# Patient Record
(demographics unavailable — no encounter records)

---

## 2024-10-10 NOTE — REASON FOR VISIT
[Home] : at home, [unfilled] , at the time of the visit. [Medical Office: ([facility])___] : at the medical office located in  [Father] : father [FreeTextEntry2] : Father

## 2024-10-10 NOTE — HISTORY OF PRESENT ILLNESS
[FreeTextEntry1] : I had the pleasure of following up your patient at [facility]       HAVEN DUMONT is a  13 year old RH followed for epilepsy and complicated migraine.  He has been diagnosed with ADHD and dyslexia.  Family is focused on the retention of cognit It is difficult to discern which is which in [place] given his language limitations, but he clearly has had both.   He has a history of abnormal thyroid function in the past, as well as speech and learning delays. He has a co-morbid diagnosis of ASD.   On 12/20, he was at tutoring. He had an episode of zoning out. He was holding the wall like he was dizzy.  He was out of it. It lasted for 45 seconds to 60 seconds.  He did have a HA afterwards. He felt like he was in a movie, by his description.    In 12/22:  he recalled that he had a similar event. He was in the car and he didn't answer. He looked like he was going to emesis. He didn't answer. He couldn't get out of the car. He gets HA frequently.   There has been other spacing events or zoning out events.  He spaces out but without any other issues associated with them.   Headaches occur 1-2/ every 2 weeks. Both parents have HA. On Adderall, he would get migraine HA with emesis.  The Focalin is being held to make sure it is not contributing to events.   He does get carsick.  He does have ADHD. Was seeing Gary Gonzalez. His developmental specialist, Keagan Blackwell is current.  He has language processing issues, and receives Special Education. He gets SETS and Speech therapy.  He is on Focalin and may have had increased HA. He didn't roll his eyes. He did not have incontinence. He looked like he was going to have emesis.  He started having HA several  7years ago, and the Adderall increased the HA. And, better on Focalin although increased   He would take tylenol. He did not miss much school, typically later in the day after school the HA occur.  He was taking vitamins -- they would make him have emesis.   He is in  special education and Speech therapy.   Sleep: does well.  Exercise: karate 2/week.  Diet:Good appetite.  Hydrate: encouraged to Drink more.      On 4/5/2023, he presented with migraines. . He was supplemented on Fish oil and Migralief.  The tutors of school  have seemed to help  him. He feels that his vision is going fast. He may feel a HA on the side of his head.  He is scared afterwards, and he is extremely fatigued afterwards.  He responded well afterwards.    He then had an event that was either convulsion vs complicated migraine on 4/6/2023.  He had another VEEG which demonstrated rare, generalized discharges, maximal right centro-temporal.  Because of the combination of HA and possible event, we elected to start TPM.  He was started in house and it was increased slowly to current dose of 50 bid.  He may have some mild word finding difficulties - but had that at baseline.  No other side effects noted.  No report of HA or convulsion noted.    6-23:  Was started on TPM in 4/16/23 due to migraine and epilepsy.  He was seen on 5/23, and had a small event. He was not clear about all the details.  He says he feels it and there are little ones. He is not tired or has a HA afterwards.  Then, this past week, he had another event while exercise.  No headache afterwards.  No materiel side effects of TPM , except for word finding issues, which have been long-term.  Events have occurred between 12- 3 pm on both occasions.  HA are controlled -- he may have had 1-2 overall, very brief, very mild.   8-23: Tolerating TPM w/o difficulty. He is completely off of the TPM since 11/15/23.  Since then, he has no episode.  Focalin on hold.  Speech and language is already a concern, so we are adjusting slowly.  Initially TPM caused some speech hesitation, now no effect.  No events since a minor one on 7/4/2023 in the am. Had not yet gone outside.  10/23: Father has documented events and categorized whether he was on Focalin vs TPM. He still has brief events of spacing out when on the TPM, about 1/month. They are not noticeable, and he will tell  the family that he had one. They are not HA, since HA are mild and he clearly states that he has a HA.  However, on the TPM and not on Focalin, his reading skills have regressed . The family states that was a problem in the past when he was not on Focalin, but now is continuing.   The family's feeling is that he should not continue TPM, since they don't feel that it has helped that much. But, they would like to restart medication for ADHD.    12-23: He is doing well. He has been weaned off TPM completely since 11/12/2023.  He had a heavy metal limit -- has increased levels of lead and mercury.  He had a chelation test.  No significant HA.  Father had an improvement in his response time, especially in terms of reading.  Focal therapy was not beneficial.   1-24:  In December, she was flapping, convulsing and was chewing.  He was not responsive. He was able to talk. His mouth was to the right side ( ?)  and speech was slurred.  Very tired, fell asleep and took a long time to stay.  Since last 11/22: he has had total of 16 events, and 4 were significant. And, the rest were brief, not sure. Parents never saw of these.  Focalin -- on for the first events.  The latest were different in that  He was on a detox on EDTA  and a lot of supplements.  Headaches are mild and sporadic. Tylenol : 1-2 in last 6 months. They do feel that his spaciness and cognitive slowing did improve after dc'ing TPM.   Behavioral: Sweet person, engaging, has learning issues.  Sleep: good, regular  Appetite: good. Omitting dairy with mild reaction.   2-24: In January 19, he had a more significant focal seizure. On January 24th, he had a "lower impact" event, as parents call it, when he zones out briefly, and he tells parents about it.  They started CBD on 2/3. He takes 1 tablet, pure isolate. 33 mg in total.  [facility], tincture.  He is not doing the EDTA chelation therapy.  He is taking Magnesium supplements. He is taking 2 capsules at night.  Nothing else since then.  Sleep: he has always been a good sleeper.  Not sedated on the CBD. No change in appetite.  Haven's brother was on CBD since 2019 -- behavioral management.   6-24:  He is on the CBD: 66 mg in the AM, and 33 mid-day, and 33 at night.  He still has monthly events. The velocity of them has decreased.  No sedation. His eating is fine.  No side effects.  He had a brief spell when he had a subjective change.  Parents did that already.  He was climbing a rope.  He had a seizure with chelation therapy. Water filtration.   6-24:  Since the last appt, Haven had an episode a few days afterwards.  More extreme than recently.  It occurred at 8 pm : 5 hours after his mid-day dose.  We had potentially wanted to increase at night.  He now takes  66 mg - 66 mg - 33 mg at night.  He gets mildly sedated from that dose.  His event happened after a hip hop class and it was very hot outside.  Little articulation about what happened exactly.  Prolonged discussion regarding his academics. Has not progressed academically over the year.  I recommended neuropsy eval, and he is due for it this year. They have him tested privately.     7-24:  Family has a place at the [facility] for the summer.  No significant updates with no significant events.  Franky has been struggling with a chronic stuffy nose. He has a deviated septum.  He does have a  Ligia Byrd for ENT. Karen helped them out with sooner appt.  No breakthrough events.  No side effects of CBD: very effective.  His weight : 105 lbs.    7-24 ( 2) : He had an event for 1-2 minutes. His head tilted to the side. He gets very hot. He was likely consciousness. He did not felt down. He retained tone. He came out of it well and did not have a HA.  It happened during the karate. In general, he has had 6 events since February: at an amusement park, at karate multiple times, at a hip hop class.  There are breaks during the class. He is not overworked or overheated.  He is on the dose below, with no change.  Frequency is about 1/month. The severity was less on the TPM, but he had cognitive effect.  The family is looking at [employer] and [employer]  Phosphatidylcholine PPC    9- 24:  He was diagnosed as DEREJE. He has deviated septum and had frequently been stuffy.  He was chronic sinus issues, trying Flonase etc.  He has a lot of allergies to begin with.  He needs clearance. [facility]  Outpatient procedure scheduled, but given his epilepsy may need to be observed overnight.  Majority in the  second, 1/2 half of the day. Seizures can occur with breathing issues, and when he gets overheated.  VEEG can be added afterwards procedure to insure that epilepsy not present.   10-24: In the interim, the patient had a more severe GTCS. They discussed treatment options with Dr Crystal and initiated LCT.  Pt is on a low dose of medication, 25 mg, with no side effects.  We reviewed side effects in detail again and father had many questions.  We also discussed increase schedule.  Pt will have ENT procedure on 11/25 and family is interested in   In addition, they did blood work : Mono+, Lyme+.: IgG and IgM  Bactrim followed by Sukumarline.   He is in 8th grade, and he is homeschooled. He goes to a home school co-op. Tutoring services in addition are being arranged.  MEDICATIONS:     Focalin 10 mg daily- on hold  CBD 66mg  tid   -Rescue Medications:  Advil prn HA   -Other medications:    Past Medications:   Adderal -- increased HA . and parents worried it induced seizures.  He was started on TPM but family felt it worsened language and caused cognitive slowing.  All: Walnuts, cats   BHx: 1 month premature. No complications in the pregnancy.  Did not need to go to nursery.   Development: Older brother, has more severe ASD .  Speech emerged late. He did have pointing. His development was delayed, but his speech is delayed. Socially, but immature.   Surg: none  FHX sig for:  Older brother with ASD HA : in father, started as a young man, probably teens. And temperature related. Every week or every other week. takes advil if needed.  Mother has sinus related HA. Needs to sleep. OTC migraine medication.  Father has significant HA with emesis, but only yearly. He walks them off. He can have word finding issues.  His older brother has more significant medical problems. He is on VPA for mood stabilization, and Aptiom.  The family saw Dr. Welsh for Sonu.  CBD has helped his mood.   REVIEW OF SYSTEMS:  A 14-point review of systems was otherwise  Significant for:  carsick, intermittent emesis if he eats too much especially junk food.          PHYSICAL EXAMINATION:   Vital signs: see chart    PHYSICAL EXAMINATION:     GENERAL:     Awake, responsive,    HEAD:  Normocephalic, atraumatic.   EYES:  Conjunctiva clear, sclera non-icteric.   NECK:  supple.   RESPIRATORY: No respiratory distress   MUSCULOSKELETAl:  full range of motion in joints.   EXTREMITIES:  no abnormalities noted.   SKIN:   no rash, no neurocutaneous lesions.      NEUROLOGIC EXAMINATION:   Mental Status/Language:  Expressive language deficit, but interactive socially.   Cranial Nerves:  PERRL, EOMI ,  no facial weakness,  hearing intact, tongue protrusion in the midline, symmetric head turn and shoulder shrug.   Strength:  No focal weakness   Coordination:  Finger to nose testing normal, no adventitial movements.   Stance/Gait:  Normal gait       TESTING:    Blood tests:  Thyroid normal in repeat testing.   EEG:    AVEEG/VEEG:  - Normal 1/2023,  4/23: VEEG in house on 4/23 demonstrated rare gen dc's that were maximal in the right centro-temp region.  - Rare gen dc's?  12/23:normal. Rechecked by myself. Multiple pushbutton markers negative.  1/24: Normal AEEG   MRI:  Normal 1/2023           Normal 3/24   Other:  Head CT: normal   IMPRESSION:     HAVEN DUMONT is a 13 year old RH with and expressive language delay ( HE DOES NOT ADHD).   He has been diagnosed with ADHD, dyslexia and language based LD.  He has events where he feels an aura, wants to lay down. Recovers after a quick nap and complains of a HA afterwards.  There is a strong family hx of migraine.  AEEG and MRI were normal in the past, but on 4/23, was admitted after a similar event, and VEEG showed rare gen dc's. maximal in right centro-temp region.   HA are well controlled.   After subsequent events, and a discussion about ASMs, family chose CBD. Haven's brother, Sonu, has nonverbal ASD, and has done very welll on CBD.  Since starting the CBD , he has had only minor events. He has no side effects from the dose.  But, subsequently, he has started on LCT, low dose.  Increase schedule provided.   PLAN:  - Coordinate VEEG testing with upcoming admission to the hospital for ENT procedure. Leads can be added in recovery.  - Seizure precautions reviewed. Clonazepam given for breakthrough epileptic event. Administration described in detail.  - Family and I have had multiple discussions regarding ASMs, and they are comfortable with his current treatment on CBD. He has no SE on CBD.   - If ASM chosen, would consider a once daily dosing, ie , LCT, LCS, or Xcopri.  LCT preferable for cognitive stability  - - For ADHD, can also consider a non-stimulant, such as Clonidine ,  Amoxetine,or  viloxetine. Family prefers to wait on this decision.  - Call with any further events, concerns, questions.  - He is on a dairy free diet with Mg supplementation   - F/u after testing.     Thank you for allowing us to participate in the care of your patient.  If you have any further questions, please call our office.

## 2024-12-04 NOTE — PHYSICAL EXAM
[de-identified] : Deleon splints removed from bilateral nares without issues.   Septum healed well, no perforation. left sided deviation significantly improved and significantly improved left side nasal airflow.  [Midline] : trachea located in midline position [de-identified] : size 2 [Normal] : assessment of respiratory effort is normal

## 2024-12-04 NOTE — PHYSICAL EXAM
[de-identified] : Deleon splints removed from bilateral nares without issues.   Septum healed well, no perforation. left sided deviation significantly improved and significantly improved left side nasal airflow.  [Midline] : trachea located in midline position [de-identified] : size 2 [Normal] : assessment of respiratory effort is normal

## 2024-12-04 NOTE — REASON FOR VISIT
[Subsequent Evaluation] : a subsequent evaluation for [FreeTextEntry2] : s/p septoplasty and inferior turbinate reduction

## 2024-12-04 NOTE — HISTORY OF PRESENT ILLNESS
[FreeTextEntry1] : Patient here s/p septoplasty and inferior turbinate outfracture 10/21/24, he is accompanied by his parents.  Healing well, no complications since surgery.

## 2024-12-04 NOTE — ASSESSMENT
[FreeTextEntry1] : 1 week s/p septoplasty and bilateral IT outfracture. Deleon splints removed bilaterally.   Healing well with significant improvement in left nare air flow.  Recommend nasal saline sprays.  Follow up in 1 month.     Jame Turner MD, MPH Director of Pediatric Otolaryngology [facility] / [facility]

## 2024-12-04 NOTE — ASSESSMENT
[FreeTextEntry1] : 1 week s/p septoplasty and bilateral IT outfracture. Deleon splints removed bilaterally.   Healing well with significant improvement in left nare air flow.  Recommend nasal saline sprays.  Follow up in 1 month.     Jame Turner MD, MPH Director of Pediatric Otolaryngology [facility] / [facility]

## 2024-12-18 NOTE — HISTORY OF PRESENT ILLNESS
[FreeTextEntry1] : I had the pleasure of following up your patient at [facility]      HAVEN DUMONT is a  14  year old RH followed for epilepsy and complicated migraine.  He has been diagnosed with ADHD and dyslexia.  Family is focused on the retention of cognit It is difficult to discern which is which in [place] given his language limitations, but he clearly has had both.   He has a history of abnormal thyroid function in the past, as well as speech and learning delays. He has a co-morbid diagnosis of ASD.   On 12/20, he was at tutoring. He had an episode of zoning out. He was holding the wall like he was dizzy.  He was out of it. It lasted for 45 seconds to 60 seconds.  He did have a HA afterwards. He felt like he was in a movie, by his description.    In 12/22:  he recalled that he had a similar event. He was in the car and he didn't answer. He looked like he was going to emesis. He didn't answer. He couldn't get out of the car. He gets HA frequently.   There has been other spacing events or zoning out events.  He spaces out but without any other issues associated with them.   Headaches occur 1-2/ every 2 weeks. Both parents have HA. On Adderall, he would get migraine HA with emesis.  The Focalin is being held to make sure it is not contributing to events.   He does get carsick.  He does have ADHD. Was seeing Gary Gonzalez. His developmental specialist, Keagan Blackwell is current.  He has language processing issues, and receives Special Education. He gets SETS and Speech therapy.  He is on Focalin and may have had increased HA. He didn't roll his eyes. He did not have incontinence. He looked like he was going to have emesis.  He started having HA several  7years ago, and the Adderall increased the HA. And, better on Focalin although increased   He would take tylenol. He did not miss much school, typically later in the day after school the HA occur.  He was taking vitamins -- they would make him have emesis.   He is in  special education and Speech therapy.   Sleep: does well.  Exercise: karate 2/week.  Diet:Good appetite.  Hydrate: encouraged to Drink more.      On 4/5/2023, he presented with migraines. . He was supplemented on Fish oil and Migralief.  The tutors of school  have seemed to help  him. He feels that his vision is going fast. He may feel a HA on the side of his head.  He is scared afterwards, and he is extremely fatigued afterwards.  He responded well afterwards.    He then had an event that was either convulsion vs complicated migraine on 4/6/2023.  He had another VEEG which demonstrated rare, generalized discharges, maximal right centro-temporal.  Because of the combination of HA and possible event, we elected to start TPM.  He was started in house and it was increased slowly to current dose of 50 bid.  He may have some mild word finding difficulties - but had that at baseline.  No other side effects noted.  No report of HA or convulsion noted.    6-23:  Was started on TPM in 4/16/23 due to migraine and epilepsy.  He was seen on 5/23, and had a small event. He was not clear about all the details.  He says he feels it and there are little ones. He is not tired or has a HA afterwards.  Then, this past week, he had another event while exercise.  No headache afterwards.  No materiel side effects of TPM , except for word finding issues, which have been long-term.  Events have occurred between 12- 3 pm on both occasions.  HA are controlled -- he may have had 1-2 overall, very brief, very mild.   8-23: Tolerating TPM w/o difficulty. He is completely off of the TPM since 11/15/23.  Since then, he has no episode.  Focalin on hold.  Speech and language is already a concern, so we are adjusting slowly.  Initially TPM caused some speech hesitation, now no effect.  No events since a minor one on 7/4/2023 in the am. Had not yet gone outside.  10/23: Father has documented events and categorized whether he was on Focalin vs TPM. He still has brief events of spacing out when on the TPM, about 1/month. They are not noticeable, and he will tell  the family that he had one. They are not HA, since HA are mild and he clearly states that he has a HA.  However, on the TPM and not on Focalin, his reading skills have regressed . The family states that was a problem in the past when he was not on Focalin, but now is continuing.   The family's feeling is that he should not continue TPM, since they don't feel that it has helped that much. But, they would like to restart medication for ADHD.    12-23: He is doing well. He has been weaned off TPM completely since 11/12/2023.  He had a heavy metal limit -- has increased levels of lead and mercury.  He had a chelation test.  No significant HA.  Father had an improvement in his response time, especially in terms of reading.  Focal therapy was not beneficial.   1-24:  In December, she was flapping, convulsing and was chewing.  He was not responsive. He was able to talk. His mouth was to the right side ( ?)  and speech was slurred.  Very tired, fell asleep and took a long time to stay.  Since last 11/22: he has had total of 16 events, and 4 were significant. And, the rest were brief, not sure. Parents never saw of these.  Focalin -- on for the first events.  The latest were different in that  He was on a detox on EDTA  and a lot of supplements.  Headaches are mild and sporadic. Tylenol : 1-2 in last 6 months. They do feel that his spaciness and cognitive slowing did improve after dc'ing TPM.   Behavioral: Sweet person, engaging, has learning issues.  Sleep: good, regular  Appetite: good. Omitting dairy with mild reaction.   2-24: In January 19, he had a more significant focal seizure. On January 24th, he had a "lower impact" event, as parents call it, when he zones out briefly, and he tells parents about it.  They started CBD on 2/3. He takes 1 tablet, pure isolate. 33 mg in total.  [facility], tincture.  He is not doing the EDTA chelation therapy.  He is taking Magnesium supplements. He is taking 2 capsules at night.  Nothing else since then.  Sleep: he has always been a good sleeper.  Not sedated on the CBD. No change in appetite.  Haven's brother was on CBD since 2019 -- behavioral management.   6-24:  He is on the CBD: 66 mg in the AM, and 33 mid-day, and 33 at night.  He still has monthly events. The velocity of them has decreased.  No sedation. His eating is fine.  No side effects.  He had a brief spell when he had a subjective change.  Parents did that already.  He was climbing a rope.  He had a seizure with chelation therapy. Water filtration.   6-24:  Since the last appt, Haven had an episode a few days afterwards.  More extreme than recently.  It occurred at 8 pm : 5 hours after his mid-day dose.  We had potentially wanted to increase at night.  He now takes  66 mg - 66 mg - 33 mg at night.  He gets mildly sedated from that dose.  His event happened after a hip hop class and it was very hot outside.  Little articulation about what happened exactly.  Prolonged discussion regarding his academics. Has not progressed academically over the year.  I recommended neuropsy eval, and he is due for it this year. They have him tested privately.     7-24:  Family has a place at the [place] for the summer.  No significant updates with no significant events.  Franky has been struggling with a chronic stuffy nose. He has a deviated septum.  He does have a  Ligia Byrd for ENT. Karen helped them out with sooner appt.  No breakthrough events.  No side effects of CBD: very effective.  His weight : 105 lbs.    7-24 ( 2) : He had an event for 1-2 minutes. His head tilted to the side. He gets very hot. He was likely consciousness. He did not felt down. He retained tone. He came out of it well and did not have a HA.  It happened during the karate. In general, he has had 6 events since February: at an amusement park, at karate multiple times, at a hip hop class.  There are breaks during the class. He is not overworked or overheated.  He is on the dose below, with no change.  Frequency is about 1/month. The severity was less on the TPM, but he had cognitive effect.  The family is looking at [employer] and [employer]  Phosphatidylcholine PPC    9- 24:  He was diagnosed as DEREJE. He has deviated septum and had frequently been stuffy.  He was chronic sinus issues, trying Flonase etc.  He has a lot of allergies to begin with.  He needs clearance. [facility]  Outpatient procedure scheduled, but given his epilepsy may need to be observed overnight.  Majority in the  second, 1/2 half of the day. Seizures can occur with breathing issues, and when he gets overheated.  VEEG can be added afterwards procedure to insure that epilepsy not present.   10-24: In the interim, the patient had a more severe GTCS. They discussed treatment options with Dr Crystal and initiated LCT.  Pt is on a low dose of medication, 25 mg, with no side effects.  We reviewed side effects in detail again and father had many questions.  We also discussed increase schedule.  Pt will have ENT procedure on 11/25 and family is interested in   In addition, they did blood work : Mono+, Lyme+.: IgG and IgM  [facility]   12-24:  He recently had an ENT procedure. He is back to normal.  Sleep: he is a good sleeper, and DEREJE. No snoring.  Bad News: after surgery, he had an event, not witnessed. He said he was standing up, and he did not lose awareness.  He feels the premonition of a seizure. Did not fall.  It was brief and he reported it. He had a bad HA.  The good news : longest duration, 66 days inbetween an event.  Before surgery: multiple vitamin supplements Post surgery: Augmenin: d6/10.  He has been seeing an acupuncturist recently.  Lamotrigine 100 mg in AM : events are between 5-9 pm   He is in 8th grade, and he is homeschooled. He goes to a home school co-op. Tutoring services in addition are being arranged.  MEDICATIONS:     Focalin 10 mg daily- on hold  CBD 66mg  tid   -Rescue Medications:  Advil prn HA   -Other medications:    Past Medications:   Adderal -- increased HA . and parents worried it induced seizures.  He was started on TPM but family felt it worsened language and caused cognitive slowing.  All: Walnuts, cats   BHx: 1 month premature. No complications in the pregnancy.  Did not need to go to nursery.   Development: Older brother, has more severe ASD .  Speech emerged late. He did have pointing. His development was delayed, but his speech is delayed. Socially, but immature.   Surg: none  FHX sig for:  Older brother with ASD HA : in father, started as a young man, probably teens. And temperature related. Every week or every other week. takes advil if needed.  Mother has sinus related HA. Needs to sleep. OTC migraine medication.  Father has significant HA with emesis, but only yearly. He walks them off. He can have word finding issues.  His older brother has more significant medical problems. He is on VPA for mood stabilization, and Aptiom.  The family saw Dr. Welsh for Sonu.  CBD has helped his mood.   REVIEW OF SYSTEMS:  A 14-point review of systems was otherwise  Significant for:  carsick, intermittent emesis if he eats too much especially junk food.          PHYSICAL EXAMINATION:   Vital signs: see chart    PHYSICAL EXAMINATION:     GENERAL:     Awake, responsive,    HEAD:  Normocephalic, atraumatic.   EYES:  Conjunctiva clear, sclera non-icteric.   NECK:  supple.   RESPIRATORY: No respiratory distress   MUSCULOSKELETAl:  full range of motion in joints.   EXTREMITIES:  no abnormalities noted.   SKIN:   no rash, no neurocutaneous lesions.      NEUROLOGIC EXAMINATION:   Mental Status/Language:  Expressive language deficit, but interactive socially.   Cranial Nerves:  PERRL, EOMI ,  no facial weakness,  hearing intact, tongue protrusion in the midline, symmetric head turn and shoulder shrug.   Strength:  No focal weakness   Coordination:  Finger to nose testing normal, no adventitial movements.   Stance/Gait:  Normal gait       TESTING:    Blood tests:  Thyroid normal in repeat testing.   EEG:    AVEEG/VEEG:  - Normal 1/2023,  4/23: VEEG in house on 4/23 demonstrated rare gen dc's that were maximal in the right centro-temp region.  - Rare gen dc's?  12/23:normal. Rechecked by myself. Multiple pushbutton markers negative.  1/24: Normal AEEG  11/24:   MRI:  Normal 1/2023           Normal 3/24   Other:  Head CT: normal   IMPRESSION:     HAVEN DUMONT is a 14 year old RH with and expressive language delay. Per father he has not been diagnosed as ASD.    He has been diagnosed with ADHD, dyslexia and language based LD.  He has events where he feels an aura, wants to lay down. Recovers after a quick nap and complains of a HA afterwards. Many times the family doesn't know he had a seizure, but he tells them afterwards.  There is a strong family hx of migraine.  AEEG and MRI were normal in the past, but on 4/23, was admitted after a similar event, and VEEG showed rare gen dc's. maximal in right centro-temp region.   HA are well controlled.   After subsequent events, and a discussion about ASMs, family chose CBD. Haven's brother, Sonu, has nonverbal ASD, and has done very welll on CBD.  Since starting the CBD , he has had only minor events. He has no side effects from the dose.  But, subsequently, he has started on LCT, low dose after a GTCS.    PLAN:  - Seizure precautions reviewed. Clonazepam given for breakthrough epileptic event. Administration described in detail.  - He does seem to have insomnia, so will try to space out LCT 50 mg po bid.   - If ASM chosen, would consider a once daily dosing, ie , LCT, LCS, or Xcopri.  LCT preferable for cognitive stability  - - For ADHD, can also consider a non-stimulant, such as Clonidine ,  Amoxetine,or  viloxetine. Family prefers to wait on this decision.  - Call with any further events, concerns, questions.  - He is on a dairy free diet with Mg supplementation   - F/u after testing.     Thank you for allowing us to participate in the care of your patient.  If you have any further questions, please call our office.

## 2024-12-18 NOTE — HISTORY OF PRESENT ILLNESS
[FreeTextEntry1] : I had the pleasure of following up your patient at [facility]      HAVEN DUMONT is a  14  year old RH followed for epilepsy and complicated migraine.  He has been diagnosed with ADHD and dyslexia.  Family is focused on the retention of cognit It is difficult to discern which is which in [place] given his language limitations, but he clearly has had both.   He has a history of abnormal thyroid function in the past, as well as speech and learning delays. He has a co-morbid diagnosis of ASD.   On 12/20, he was at tutoring. He had an episode of zoning out. He was holding the wall like he was dizzy.  He was out of it. It lasted for 45 seconds to 60 seconds.  He did have a HA afterwards. He felt like he was in a movie, by his description.    In 12/22:  he recalled that he had a similar event. He was in the car and he didn't answer. He looked like he was going to emesis. He didn't answer. He couldn't get out of the car. He gets HA frequently.   There has been other spacing events or zoning out events.  He spaces out but without any other issues associated with them.   Headaches occur 1-2/ every 2 weeks. Both parents have HA. On Adderall, he would get migraine HA with emesis.  The Focalin is being held to make sure it is not contributing to events.   He does get carsick.  He does have ADHD. Was seeing Gary Gonzalez. His developmental specialist, Keagan Blackwell is current.  He has language processing issues, and receives Special Education. He gets SETS and Speech therapy.  He is on Focalin and may have had increased HA. He didn't roll his eyes. He did not have incontinence. He looked like he was going to have emesis.  He started having HA several  7years ago, and the Adderall increased the HA. And, better on Focalin although increased   He would take tylenol. He did not miss much school, typically later in the day after school the HA occur.  He was taking vitamins -- they would make him have emesis.   He is in  special education and Speech therapy.   Sleep: does well.  Exercise: karate 2/week.  Diet:Good appetite.  Hydrate: encouraged to Drink more.      On 4/5/2023, he presented with migraines. . He was supplemented on Fish oil and Migralief.  The tutors of school  have seemed to help  him. He feels that his vision is going fast. He may feel a HA on the side of his head.  He is scared afterwards, and he is extremely fatigued afterwards.  He responded well afterwards.    He then had an event that was either convulsion vs complicated migraine on 4/6/2023.  He had another VEEG which demonstrated rare, generalized discharges, maximal right centro-temporal.  Because of the combination of HA and possible event, we elected to start TPM.  He was started in house and it was increased slowly to current dose of 50 bid.  He may have some mild word finding difficulties - but had that at baseline.  No other side effects noted.  No report of HA or convulsion noted.    6-23:  Was started on TPM in 4/16/23 due to migraine and epilepsy.  He was seen on 5/23, and had a small event. He was not clear about all the details.  He says he feels it and there are little ones. He is not tired or has a HA afterwards.  Then, this past week, he had another event while exercise.  No headache afterwards.  No materiel side effects of TPM , except for word finding issues, which have been long-term.  Events have occurred between 12- 3 pm on both occasions.  HA are controlled -- he may have had 1-2 overall, very brief, very mild.   8-23: Tolerating TPM w/o difficulty. He is completely off of the TPM since 11/15/23.  Since then, he has no episode.  Focalin on hold.  Speech and language is already a concern, so we are adjusting slowly.  Initially TPM caused some speech hesitation, now no effect.  No events since a minor one on 7/4/2023 in the am. Had not yet gone outside.  10/23: Father has documented events and categorized whether he was on Focalin vs TPM. He still has brief events of spacing out when on the TPM, about 1/month. They are not noticeable, and he will tell  the family that he had one. They are not HA, since HA are mild and he clearly states that he has a HA.  However, on the TPM and not on Focalin, his reading skills have regressed . The family states that was a problem in the past when he was not on Focalin, but now is continuing.   The family's feeling is that he should not continue TPM, since they don't feel that it has helped that much. But, they would like to restart medication for ADHD.    12-23: He is doing well. He has been weaned off TPM completely since 11/12/2023.  He had a heavy metal limit -- has increased levels of lead and mercury.  He had a chelation test.  No significant HA.  Father had an improvement in his response time, especially in terms of reading.  Focal therapy was not beneficial.   1-24:  In December, she was flapping, convulsing and was chewing.  He was not responsive. He was able to talk. His mouth was to the right side ( ?)  and speech was slurred.  Very tired, fell asleep and took a long time to stay.  Since last 11/22: he has had total of 16 events, and 4 were significant. And, the rest were brief, not sure. Parents never saw of these.  Focalin -- on for the first events.  The latest were different in that  He was on a detox on EDTA  and a lot of supplements.  Headaches are mild and sporadic. Tylenol : 1-2 in last 6 months. They do feel that his spaciness and cognitive slowing did improve after dc'ing TPM.   Behavioral: Sweet person, engaging, has learning issues.  Sleep: good, regular  Appetite: good. Omitting dairy with mild reaction.   2-24: In January 19, he had a more significant focal seizure. On January 24th, he had a "lower impact" event, as parents call it, when he zones out briefly, and he tells parents about it.  They started CBD on 2/3. He takes 1 tablet, pure isolate. 33 mg in total.  Formerly [facility], tincture.  He is not doing the EDTA chelation therapy.  He is taking Magnesium supplements. He is taking 2 capsules at night.  Nothing else since then.  Sleep: he has always been a good sleeper.  Not sedated on the CBD. No change in appetite.  Haven's brother was on CBD since 2019 -- behavioral management.   6-24:  He is on the CBD: 66 mg in the AM, and 33 mid-day, and 33 at night.  He still has monthly events. The velocity of them has decreased.  No sedation. His eating is fine.  No side effects.  He had a brief spell when he had a subjective change.  Parents did that already.  He was climbing a rope.  He had a seizure with chelation therapy. Water filtration.   6-24:  Since the last appt, Haven had an episode a few days afterwards.  More extreme than recently.  It occurred at 8 pm : 5 hours after his mid-day dose.  We had potentially wanted to increase at night.  He now takes  66 mg - 66 mg - 33 mg at night.  He gets mildly sedated from that dose.  His event happened after a hip hop class and it was very hot outside.  Little articulation about what happened exactly.  Prolonged discussion regarding his academics. Has not progressed academically over the year.  I recommended neuropsy eval, and he is due for it this year. They have him tested privately.     7-24:  Family has a place at the [place] for the summer.  No significant updates with no significant events.  Franky has been struggling with a chronic stuffy nose. He has a deviated septum.  He does have a  Ligia Byrd for ENT. Kraen helped them out with sooner appt.  No breakthrough events.  No side effects of CBD: very effective.  His weight : 105 lbs.    7-24 ( 2) : He had an event for 1-2 minutes. His head tilted to the side. He gets very hot. He was likely consciousness. He did not felt down. He retained tone. He came out of it well and did not have a HA.  It happened during the karate. In general, he has had 6 events since February: at an amusement park, at karate multiple times, at a hip hop class.  There are breaks during the class. He is not overworked or overheated.  He is on the dose below, with no change.  Frequency is about 1/month. The severity was less on the TPM, but he had cognitive effect.  The family is looking at [employer] and [employer]  Phosphatidylcholine PPC    9- 24:  He was diagnosed as DEREJE. He has deviated septum and had frequently been stuffy.  He was chronic sinus issues, trying Flonase etc.  He has a lot of allergies to begin with.  He needs clearance. [facility].  Outpatient procedure scheduled, but given his epilepsy may need to be observed overnight.  Majority in the  second, 1/2 half of the day. Seizures can occur with breathing issues, and when he gets overheated.  VEEG can be added afterwards procedure to insure that epilepsy not present.   10-24: In the interim, the patient had a more severe GTCS. They discussed treatment options with Dr Crystal and initiated LCT.  Pt is on a low dose of medication, 25 mg, with no side effects.  We reviewed side effects in detail again and father had many questions.  We also discussed increase schedule.  Pt will have ENT procedure on 11/25 and family is interested in   In addition, they did blood work : Mono+, Lyme+.: IgG and IgM  [facility]   12-24:  He recently had an ENT procedure. He is back to normal.  Sleep: he is a good sleeper, and DEREJE. No snoring.  Bad News: after surgery, he had an event, not witnessed. He said he was standing up, and he did not lose awareness.  He feels the premonition of a seizure. Did not fall.  It was brief and he reported it. He had a bad HA.  The good news : longest duration, 66 days inbetween an event.  Before surgery: multiple vitamin supplements Post surgery: Augmenin: d6/10.  He has been seeing an acupuncturist recently.  Lamotrigine 100 mg in AM : events are between 5-9 pm   He is in 8th grade, and he is homeschooled. He goes to a home school co-op. Tutoring services in addition are being arranged.  MEDICATIONS:     Focalin 10 mg daily- on hold  CBD 66mg  tid   -Rescue Medications:  Advil prn HA   -Other medications:    Past Medications:   Adderal -- increased HA . and parents worried it induced seizures.  He was started on TPM but family felt it worsened language and caused cognitive slowing.  All: Walnuts, cats   BHx: 1 month premature. No complications in the pregnancy.  Did not need to go to nursery.   Development: Older brother, has more severe ASD .  Speech emerged late. He did have pointing. His development was delayed, but his speech is delayed. Socially, but immature.   Surg: none  FHX sig for:  Older brother with ASD HA : in father, started as a young man, probably teens. And temperature related. Every week or every other week. takes advil if needed.  Mother has sinus related HA. Needs to sleep. OTC migraine medication.  Father has significant HA with emesis, but only yearly. He walks them off. He can have word finding issues.  His older brother has more significant medical problems. He is on VPA for mood stabilization, and Aptiom.  The family saw Dr. Welsh for Sonu.  CBD has helped his mood.   REVIEW OF SYSTEMS:  A 14-point review of systems was otherwise  Significant for:  carsick, intermittent emesis if he eats too much especially junk food.          PHYSICAL EXAMINATION:   Vital signs: see chart    PHYSICAL EXAMINATION:     GENERAL:     Awake, responsive,    HEAD:  Normocephalic, atraumatic.   EYES:  Conjunctiva clear, sclera non-icteric.   NECK:  supple.   RESPIRATORY: No respiratory distress   MUSCULOSKELETAl:  full range of motion in joints.   EXTREMITIES:  no abnormalities noted.   SKIN:   no rash, no neurocutaneous lesions.      NEUROLOGIC EXAMINATION:   Mental Status/Language:  Expressive language deficit, but interactive socially.   Cranial Nerves:  PERRL, EOMI ,  no facial weakness,  hearing intact, tongue protrusion in the midline, symmetric head turn and shoulder shrug.   Strength:  No focal weakness   Coordination:  Finger to nose testing normal, no adventitial movements.   Stance/Gait:  Normal gait       TESTING:    Blood tests:  Thyroid normal in repeat testing.   EEG:    AVEEG/VEEG:  - Normal 1/2023,  4/23: VEEG in house on 4/23 demonstrated rare gen dc's that were maximal in the right centro-temp region.  - Rare gen dc's?  12/23:normal. Rechecked by myself. Multiple pushbutton markers negative.  1/24: Normal AEEG  11/24:   MRI:  Normal 1/2023           Normal 3/24   Other:  Head CT: normal   IMPRESSION:     HAVEN DUMONT is a 14 year old RH with and expressive language delay. Per father he has not been diagnosed as ASD.    He has been diagnosed with ADHD, dyslexia and language based LD.  He has events where he feels an aura, wants to lay down. Recovers after a quick nap and complains of a HA afterwards. Many times the family doesn't know he had a seizure, but he tells them afterwards.  There is a strong family hx of migraine.  AEEG and MRI were normal in the past, but on 4/23, was admitted after a similar event, and VEEG showed rare gen dc's. maximal in right centro-temp region.   HA are well controlled.   After subsequent events, and a discussion about ASMs, family chose CBD. Haven's brother, Sonu, has nonverbal ASD, and has done very welll on CBD.  Since starting the CBD , he has had only minor events. He has no side effects from the dose.  But, subsequently, he has started on LCT, low dose after a GTCS.    PLAN:  - Seizure precautions reviewed. Clonazepam given for breakthrough epileptic event. Administration described in detail.  - He does seem to have insomnia, so will try to space out LCT 50 mg po bid.   - If ASM chosen, would consider a once daily dosing, ie , LCT, LCS, or Xcopri.  LCT preferable for cognitive stability  - - For ADHD, can also consider a non-stimulant, such as Clonidine ,  Amoxetine,or  viloxetine. Family prefers to wait on this decision.  - Call with any further events, concerns, questions.  - He is on a dairy free diet with Mg supplementation   - F/u after testing.     Thank you for allowing us to participate in the care of your patient.  If you have any further questions, please call our office.

## 2024-12-27 NOTE — REASON FOR VISIT
[Post-Operative Visit] : a post-operative visit [FreeTextEntry2] : septoplasty and inferior turbinate out fracture 10/21/24

## 2024-12-27 NOTE — PHYSICAL EXAM
[de-identified] :   Septum healed well, no perforation. left sided deviation significantly improved and significantly improved left side nasal airflow. Mild crusting of left nasal cavity anteriorly.  [Midline] : trachea located in midline position [de-identified] : size 2 [Normal] : assessment of respiratory effort is normal

## 2024-12-27 NOTE — HISTORY OF PRESENT ILLNESS
[FreeTextEntry1] : Patient here s/p septoplasty and inferior turbinate out fracture 10/21/24, he is accompanied by his parents. He states he is doing well.  Had crusting build up in left nare that spontaneously passed.  Admits to not using nasal saline consistently.  No epistaxis, no longer snores.

## 2024-12-27 NOTE — PHYSICAL EXAM
[de-identified] :   Septum healed well, no perforation. left sided deviation significantly improved and significantly improved left side nasal airflow. Mild crusting of left nasal cavity anteriorly.  [Midline] : trachea located in midline position [de-identified] : size 2 [Normal] : assessment of respiratory effort is normal

## 2024-12-27 NOTE — ASSESSMENT
[FreeTextEntry1] : 5 weeks s/p septoplasty and bilateral IT outfracture. Deleon splints removed bilaterally.   Healing well with significant improvement in left nare air flow.  Reinforced need for nasal saline sprays.  Follow up in 3 months.     Jame Turner MD, MPH Director of Pediatric Otolaryngology [facility] / [facility]

## 2024-12-27 NOTE — ASSESSMENT
[FreeTextEntry1] : 5 weeks s/p septoplasty and bilateral IT outfracture. Deleon splints removed bilaterally.   Healing well with significant improvement in left nare air flow.  Reinforced need for nasal saline sprays.  Follow up in 3 months.     Jame Turner MD, MPH Director of Pediatric Otolaryngology [facility] / [facility]

## 2025-03-19 NOTE — HISTORY OF PRESENT ILLNESS
[Father] : father [Home] : at home, [unfilled] , at the time of the visit. [Medical Office: ([facility])___] : at the medical office located in  [Telehealth (audio & video)] : This visit was provided via telehealth using real-time 2-way audio visual technology. [Verbal consent obtained from patient] : the patient, [unfilled] [FreeTextEntry3] : Pa [FreeTextEntry1] : I had the pleasure of following up your patient at [facility]      HAVEN DUMONT is a  14  year old RH followed for epilepsy and complicated migraine.  He has been diagnosed with ADHD and dyslexia.  Family is focused on the retention of cognit It is difficult to discern which is which in [place] given his language limitations, but he clearly has had both.   He has a history of abnormal thyroid function in the past, as well as speech and learning delays. He has a co-morbid diagnosis of ASD.   On 12/20, he was at tutoring. He had an episode of zoning out. He was holding the wall like he was dizzy.  He was out of it. It lasted for 45 seconds to 60 seconds.  He did have a HA afterwards. He felt like he was in a movie, by his description.    In 12/22:  he recalled that he had a similar event. He was in the car and he didn't answer. He looked like he was going to emesis. He didn't answer. He couldn't get out of the car. He gets HA frequently.   There has been other spacing events or zoning out events.  He spaces out but without any other issues associated with them.   Headaches occur 1-2/ every 2 weeks. Both parents have HA. On Adderall, he would get migraine HA with emesis.  The Focalin is being held to make sure it is not contributing to events.   He does get carsick.  He does have ADHD. Was seeing Gary Gonzalez. His developmental specialist, Keagan Blackwell is current.  He has language processing issues, and receives Special Education. He gets SETS and Speech therapy.  He is on Focalin and may have had increased HA. He didn't roll his eyes. He did not have incontinence. He looked like he was going to have emesis.  He started having HA several  7years ago, and the Adderall increased the HA. And, better on Focalin although increased   He would take tylenol. He did not miss much school, typically later in the day after school the HA occur.  He was taking vitamins -- they would make him have emesis.   He is in  special education and Speech therapy.   Sleep: does well.  Exercise: karate 2/week.  Diet:Good appetite.  Hydrate: encouraged to Drink more.      On 4/5/2023, he presented with migraines. . He was supplemented on Fish oil and Migralief.  The tutors of school  have seemed to help  him. He feels that his vision is going fast. He may feel a HA on the side of his head.  He is scared afterwards, and he is extremely fatigued afterwards.  He responded well afterwards.    He then had an event that was either convulsion vs complicated migraine on 4/6/2023.  He had another VEEG which demonstrated rare, generalized discharges, maximal right centro-temporal.  Because of the combination of HA and possible event, we elected to start TPM.  He was started in house and it was increased slowly to current dose of 50 bid.  He may have some mild word finding difficulties - but had that at baseline.  No other side effects noted.  No report of HA or convulsion noted.    6-23:  Was started on TPM in 4/16/23 due to migraine and epilepsy.  He was seen on 5/23, and had a small event. He was not clear about all the details.  He says he feels it and there are little ones. He is not tired or has a HA afterwards.  Then, this past week, he had another event while exercise.  No headache afterwards.  No materiel side effects of TPM , except for word finding issues, which have been long-term.  Events have occurred between 12- 3 pm on both occasions.  HA are controlled -- he may have had 1-2 overall, very brief, very mild.   8-23: Tolerating TPM w/o difficulty. He is completely off of the TPM since 11/15/23.  Since then, he has no episode.  Focalin on hold.  Speech and language is already a concern, so we are adjusting slowly.  Initially TPM caused some speech hesitation, now no effect.  No events since a minor one on 7/4/2023 in the am. Had not yet gone outside.  10/23: Father has documented events and categorized whether he was on Focalin vs TPM. He still has brief events of spacing out when on the TPM, about 1/month. They are not noticeable, and he will tell  the family that he had one. They are not HA, since HA are mild and he clearly states that he has a HA.  However, on the TPM and not on Focalin, his reading skills have regressed . The family states that was a problem in the past when he was not on Focalin, but now is continuing.   The family's feeling is that he should not continue TPM, since they don't feel that it has helped that much. But, they would like to restart medication for ADHD.    12-23: He is doing well. He has been weaned off TPM completely since 11/12/2023.  He had a heavy metal limit -- has increased levels of lead and mercury.  He had a chelation test.  No significant HA.  Father had an improvement in his response time, especially in terms of reading.  Focal therapy was not beneficial.   1-24:  In December, she was flapping, convulsing and was chewing.  He was not responsive. He was able to talk. His mouth was to the right side ( ?)  and speech was slurred.  Very tired, fell asleep and took a long time to stay.  Since last 11/22: he has had total of 16 events, and 4 were significant. And, the rest were brief, not sure. Parents never saw of these.  Focalin -- on for the first events.  The latest were different in that  He was on a detox on EDTA  and a lot of supplements.  Headaches are mild and sporadic. Tylenol : 1-2 in last 6 months. They do feel that his spaciness and cognitive slowing did improve after dc'ing TPM.   Behavioral: Sweet person, engaging, has learning issues.  Sleep: good, regular  Appetite: good. Omitting dairy with mild reaction.   2-24: In January 19, he had a more significant focal seizure. On January 24th, he had a "lower impact" event, as parents call it, when he zones out briefly, and he tells parents about it.  They started CBD on 2/3. He takes 1 tablet, pure isolate. 33 mg in total.  [facility], tincture.  He is not doing the EDTA chelation therapy.  He is taking Magnesium supplements. He is taking 2 capsules at night.  Nothing else since then.  Sleep: he has always been a good sleeper.  Not sedated on the CBD. No change in appetite.  Haven's brother was on CBD since 2019 -- behavioral management.   6-24:  He is on the CBD: 66 mg in the AM, and 33 mid-day, and 33 at night.  He still has monthly events. The velocity of them has decreased.  No sedation. His eating is fine.  No side effects.  He had a brief spell when he had a subjective change.  Parents did that already.  He was climbing a rope.  He had a seizure with chelation therapy. Water filtration.   6-24:  Since the last appt, Haven had an episode a few days afterwards.  More extreme than recently.  It occurred at 8 pm : 5 hours after his mid-day dose.  We had potentially wanted to increase at night.  He now takes  66 mg - 66 mg - 33 mg at night.  He gets mildly sedated from that dose.  His event happened after a hip hop class and it was very hot outside.  Little articulation about what happened exactly.  Prolonged discussion regarding his academics. Has not progressed academically over the year.  I recommended neuropsy eval, and he is due for it this year. They have him tested privately.     7-24:  Family has a place at the [place] for the summer.  No significant updates with no significant events.  Franky has been struggling with a chronic stuffy nose. He has a deviated septum.  He does have a  Ligia Byrd for ENT. Karen helped them out with sooner appt.  No breakthrough events.  No side effects of CBD: very effective.  His weight : 105 lbs.    7-24 ( 2) : He had an event for 1-2 minutes. His head tilted to the side. He gets very hot. He was likely consciousness. He did not felt down. He retained tone. He came out of it well and did not have a HA.  It happened during the karate. In general, he has had 6 events since February: at an amusement park, at karate multiple times, at a hip hop class.  There are breaks during the class. He is not overworked or overheated.  He is on the dose below, with no change.  Frequency is about 1/month. The severity was less on the TPM, but he had cognitive effect.  The family is looking at [employer] and [employer]  Phosphatidylcholine PPC    9- 24:  He was diagnosed as DEREJE. He has deviated septum and had frequently been stuffy.  He was chronic sinus issues, trying Flonase etc.  He has a lot of allergies to begin with.  He needs clearance. [facility]  Outpatient procedure scheduled, but given his epilepsy may need to be observed overnight.  Majority in the  second, 1/2 half of the day. Seizures can occur with breathing issues, and when he gets overheated.  VEEG can be added afterwards procedure to insure that epilepsy not present.   10-24: In the interim, the patient had a more severe GTCS. They discussed treatment options with Dr Crystal and initiated LCT.  Pt is on a low dose of medication, 25 mg, with no side effects.  We reviewed side effects in detail again and father had many questions.  We also discussed increase schedule.  Pt will have ENT procedure on 11/25 and family is interested in   In addition, they did blood work : Mono+, Lyme+.: IgG and IgM  Bactrim followed by Doxycline.   12-24:  He recently had an ENT procedure. He is back to normal.  Sleep: he is a good sleeper, and DEREJE. No snoring.  Bad News: after surgery, he had an event, not witnessed. He said he was standing up, and he did not lose awareness.  He feels the premonition of a seizure. Did not fall.  It was brief and he reported it. He had a bad HA.  The good news : longest duration, 66 days inbetween an event.  Before surgery: multiple vitamin supplements Post surgery: Augmenin: d6/10.  He has been seeing an acupuncturist recently.  Lamotrigine 100 mg in AM : events are between 5-9 pm   3-25:   It has been 93 days -- his best duration ever. He was going to his school program. MWF, 30 kids in a coop. T, Th, 1:1 tutoring.  The  did not notice it.  He didn't look right and he looked out of it afterwards.  It took 30 - 45 minutes to bounce back.   He is in 8th grade, and he is homeschooled. He goes to a home school co-op. Tutoring services in addition are being arranged.  MEDICATIONS:    Lamotrigine 50, 50 mg po bid in the AM, and at 3 PM. We switched to him having break through seizures in the early evening -- assumed to be a fast metabolizer sig SE.  Focalin 10 mg daily- on hold  CBD 66mg  tid     -Rescue Medications:  Advil prn HA   -Other medications:    Past Medications:   Adderal -- increased HA . and parents worried it induced seizures.  He was started on TPM but family felt it worsened language and caused cognitive slowing.  All: Walnuts, cats   BHx: 1 month premature. No complications in the pregnancy.  Did not need to go to nursery.   Development: Older brother, has more severe ASD .  Speech emerged late. He did have pointing. His development was delayed, but his speech is delayed. Socially, but immature.   Surg: none  FHX sig for:  Older brother with ASD HA : in father, started as a young man, probably teens. And temperature related. Every week or every other week. takes advil if needed.  Mother has sinus related HA. Needs to sleep. OTC migraine medication.  Father has significant HA with emesis, but only yearly. He walks them off. He can have word finding issues.  His older brother has more significant medical problems. He is on VPA for mood stabilization, and Aptiom.  The family saw Dr. Welsh for Sonu.  CBD has helped his mood.   REVIEW OF SYSTEMS:  A 14-point review of systems was otherwise  Significant for:  carsick, intermittent emesis if he eats too much especially junk food.     PHYSICAL EXAMINATION:   Vital signs: see chart    PHYSICAL EXAMINATION:     GENERAL:     Awake, responsive,    HEAD:  Normocephalic, atraumatic.   EYES:  Conjunctiva clear, sclera non-icteric.   NECK:  supple.   RESPIRATORY: No respiratory distress   MUSCULOSKELETAl:  full range of motion in joints.   EXTREMITIES:  no abnormalities noted.   SKIN:   no rash, no neurocutaneous lesions.      NEUROLOGIC EXAMINATION:   Mental Status/Language:  Expressive language deficit, but interactive socially.   Cranial Nerves:  PERRL, EOMI ,  no facial weakness,  hearing intact, tongue protrusion in the midline, symmetric head turn and shoulder shrug.   Strength:  No focal weakness   Coordination:  Finger to nose testing normal, no adventitial movements.   Stance/Gait:  Normal gait       TESTING:    Blood tests:  Thyroid normal in repeat testing.   EEG:    AVEEG/VEEG:  - Normal 1/2023,  4/23: VEEG in house on 4/23 demonstrated rare gen dc's that were maximal in the right centro-temp region.  - Rare gen dc's?  12/23:normal. Rechecked by myself. Multiple pushbutton markers negative.  1/24: Normal AEEG  11/24:   MRI:  Normal 1/2023           Normal 3/24   Other:  Head CT: normal   IMPRESSION:     HAVEN DUMONT is a 14 year old RH with and expressive language delay. Per father he has not been diagnosed as ASD.    He has been diagnosed with ADHD, dyslexia and language based LD.  He has events where he feels an aura, wants to lay down. Recovers after a quick nap and complains of a HA afterwards. Many times the family doesn't know he had a seizure, but he tells them afterwards.  There is a strong family hx of migraine.  AEEG and MRI were normal in the past, but on 4/23, was admitted after a similar event, and VEEG showed rare gen dc's. maximal in right centro-temp region.   HA are well controlled.   After subsequent events, and a discussion about ASMs, family chose CBD. Haven's brother, Sonu, has nonverbal ASD, and has done very welll on CBD.  Since starting the CBD , he has had only minor events. He has no side effects from the dose.  But, subsequently, he has started on LCT, low dose after a GTCS.  He tolerates it well, and no side effects.    PLAN:  - Seizure precautions reviewed. Clonazepam given for breakthrough epileptic event. Administration described in detail.  - He does seem to have insomnia, so will try to space out LCT 75 mg po bid  - - For ADHD, can also consider a non-stimulant, such as Clonidine ,  Amoxetine,or  viloxetine. Family prefers to wait on this decision.  - Call with any further events, concerns, questions.  - He is on a dairy free diet with Mg supplementation   - F/u in 3 months      This 25 minute return  ( [id number] )  patient appointment included a detailed history taking, physical examination, review of test results in addition to a discussion of  treatment options, and management of the condition, and questions from the patient and parent. Thank you for allowing us to participate in the care of your patient.  If you have any further questions, please call our office.

## 2025-06-20 NOTE — ASSESSMENT
[FreeTextEntry1] : 4months s/p septoplasty and bilateral IT outfracture. Doing well.  Reinforced need for nasal saline sprays qd / BID  FOllow up yearly.  Sooner return precautions discussed.     Total time spent on patient encounter including review of patient's medical history, physical examination, interpretation of any indicated labs / imaging and counseling, excluding time spent performing any indicated procedures: 37 minutes.    Jame Turner MD, MPH Director of Pediatric Otolaryngology [facility] / [facility]

## 2025-06-20 NOTE — HISTORY OF PRESENT ILLNESS
[FreeTextEntry1] : Pt returns today s/p septoplasty and bilateral IT outfracture. Accompanied by mother.  Reports that he is doing well. Denies trouble breathing or signs of bleeding.  No further complaints or concerns.

## 2025-06-20 NOTE — REASON FOR VISIT
[Subsequent Evaluation] : a subsequent evaluation for [FreeTextEntry2] : s/p septoplasty and bilateral IT outfracture.

## 2025-06-20 NOTE — PHYSICAL EXAM
[de-identified] :   Septum healed well, no perforation. left sided deviation significantly improved and significantly improved left side nasal airflow. Mild crusting on medial surface of IT - non-obstructive.  [Midline] : trachea located in midline position [de-identified] : size 2 [Normal] : assessment of respiratory effort is normal

## 2025-06-20 NOTE — PHYSICAL EXAM
[de-identified] :   Septum healed well, no perforation. left sided deviation significantly improved and significantly improved left side nasal airflow. Mild crusting on medial surface of IT - non-obstructive.  [Midline] : trachea located in midline position [de-identified] : size 2 [Normal] : assessment of respiratory effort is normal

## 2025-06-20 NOTE — ASSESSMENT
[FreeTextEntry1] : 4months s/p septoplasty and bilateral IT outfracture. Doing well.  Reinforced need for nasal saline sprays qd / BID  FOllow up yearly.  Sooner return precautions discussed.     Total time spent on patient encounter including review of patient's medical history, physical examination, interpretation of any indicated labs / imaging and counseling, excluding time spent performing any indicated procedures: 37 minutes.    Jame Turner MD, MPH Director of Pediatric Otolaryngology [facility] / [facility]